# Patient Record
Sex: FEMALE | Race: WHITE | NOT HISPANIC OR LATINO
[De-identification: names, ages, dates, MRNs, and addresses within clinical notes are randomized per-mention and may not be internally consistent; named-entity substitution may affect disease eponyms.]

---

## 2018-11-06 ENCOUNTER — TRANSCRIBE ORDERS (OUTPATIENT)
Dept: SCHEDULING | Age: 58
End: 2018-11-06

## 2018-11-06 DIAGNOSIS — Z12.31 ENCOUNTER FOR SCREENING MAMMOGRAM FOR MALIGNANT NEOPLASM OF BREAST: Primary | ICD-10-CM

## 2019-02-11 ENCOUNTER — TRANSCRIBE ORDERS (OUTPATIENT)
Dept: RADIOLOGY | Facility: HOSPITAL | Age: 59
End: 2019-02-11

## 2019-02-11 ENCOUNTER — HOSPITAL ENCOUNTER (OUTPATIENT)
Dept: RADIOLOGY | Facility: HOSPITAL | Age: 59
Discharge: HOME | End: 2019-02-11
Attending: NURSE PRACTITIONER
Payer: COMMERCIAL

## 2019-02-11 DIAGNOSIS — Z12.31 ENCOUNTER FOR SCREENING MAMMOGRAM FOR MALIGNANT NEOPLASM OF BREAST: ICD-10-CM

## 2019-02-11 PROCEDURE — 77063 BREAST TOMOSYNTHESIS BI: CPT

## 2019-10-25 ENCOUNTER — OFFICE VISIT (OUTPATIENT)
Dept: GASTROENTEROLOGY | Facility: CLINIC | Age: 59
End: 2019-10-25
Payer: COMMERCIAL

## 2019-10-25 VITALS
DIASTOLIC BLOOD PRESSURE: 78 MMHG | WEIGHT: 186 LBS | HEART RATE: 80 BPM | HEIGHT: 65 IN | SYSTOLIC BLOOD PRESSURE: 122 MMHG | BODY MASS INDEX: 30.99 KG/M2

## 2019-10-25 DIAGNOSIS — K58.0 IRRITABLE BOWEL SYNDROME WITH DIARRHEA: ICD-10-CM

## 2019-10-25 DIAGNOSIS — K57.92 DIVERTICULITIS: Primary | ICD-10-CM

## 2019-10-25 DIAGNOSIS — K21.9 GASTROESOPHAGEAL REFLUX DISEASE WITHOUT ESOPHAGITIS: ICD-10-CM

## 2019-10-25 DIAGNOSIS — K57.32 DIVERTICULITIS OF LARGE INTESTINE WITHOUT PERFORATION OR ABSCESS WITHOUT BLEEDING: Primary | ICD-10-CM

## 2019-10-25 PROCEDURE — 99214 OFFICE O/P EST MOD 30 MIN: CPT | Performed by: INTERNAL MEDICINE

## 2019-10-25 RX ORDER — CLONAZEPAM 1 MG/1
1 TABLET ORAL AS NEEDED
COMMUNITY

## 2019-10-25 RX ORDER — METRONIDAZOLE 500 MG/1
500 TABLET ORAL 3 TIMES DAILY
Qty: 30 TABLET | Refills: 0 | Status: SHIPPED | OUTPATIENT
Start: 2019-10-25 | End: 2019-11-04

## 2019-10-25 RX ORDER — LEVOFLOXACIN 500 MG/1
500 TABLET, FILM COATED ORAL EVERY 24 HOURS
Qty: 10 TABLET | Refills: 0 | Status: SHIPPED | OUTPATIENT
Start: 2019-10-25 | End: 2019-11-04

## 2019-10-25 NOTE — PATIENT INSTRUCTIONS
complete antibiotics as prescribed  Please contact us if symptoms fail to improve we could discuss prolonged antibiotics or arranging a CT scan    Proceed with colonoscopy in about 6 weeks

## 2019-10-25 NOTE — PROGRESS NOTES
[facility] - Outpatient Consultation  Harshad Valera 63 y o  female MRN: [id number]  Encounter: [id number]    ASSESSMENT AND PLAN:      1  Diverticulitis of large intestine without perforation or abscess without bleeding  prior history of diverticulitis, sigmoid resection in 2016, intermittent flare ups over the past several months that initially responded to bowel rest, but have become more refractory  Will treat with oral antibiotics  she will contact me if symptoms fail to improve and we can arrange imaging  Will proceed with a colonoscopy in 6-8 weeks  - metroNIDAZOLE (FLAGYL) 500 mg tablet; Take 1 tablet (500 mg total) by mouth 3 (three) times a day for 10 days  Dispense: 30 tablet; Refill: 0  - levofloxacin (LEVAQUIN) 500 mg tablet; Take 1 tablet (500 mg total) by mouth every 24 hours for 10 days  Dispense: 10 tablet; Refill: 0      2  Gastroesophageal reflux disease without esophagitis    Well controlled with anti-reflux diet    3  Irritable bowel syndrome with diarrhea   had been well controlled with Levbid as needed  Stools have been softer recently  Will assess further at time of upcoming colonoscopy      Followup Appointment:  Pending colonoscopy  ______________________________________________________________________    Chief Complaint   Patient presents with    Abd  pain off and on, history of diverticuliltis       HPI:   Harshad Valera is a 63y o  year old female who presents   As a self-referral for abdominal pain  She was last seen by our practice in June of 2014 at the time of hospitalization at [facility] for acute sigmoid diverticulitis  She subsequently had a sigmoid resection with Dr Libby Flores   She was asymptomatic from the GI perspective until earlier this year  Earlier this year she had a few mild episodes of abdominal discomfort reminiscent of prior bouts of diverticulitis    She restricting herself to a clear liquid diet and symptoms resolved within a few days without the need for antibiotics  She had symptoms in early September and again in early October with symptoms lasting several days  She currently complains of crampy periumbilical pain that radiates to the left lower quadrant  Symptoms are associated with bloating and abdominal distention  Stools are looser than her typically IBS-D baseline  She denies any nausea, vomiting or weight loss  She denies any specific food triggers  She denies any rectal bleeding  She has no fevers or chills  She denies any travel, sick contacts or recent antibiotics  Historical Information   Past Medical History:   Diagnosis Date    Depression      Past Surgical History:   Procedure Laterality Date    [facility]    COLONOSCOPY  06/13/2014     pandiverticulosis     Social History     Substance and Sexual Activity   Alcohol Use Yes    Frequency: 2-3 times a week     Social History     Substance and Sexual Activity   Drug Use Not on file     Social History     Tobacco Use   Smoking Status Former Smoker   Smokeless Tobacco Never Used     Family History   Problem Relation Age of Onset    Other Mother         Ruptured colon due to diverticulitis    Colon cancer Maternal Grandmother     Colon polyps Neg Hx        Meds/Allergies     Current Outpatient Medications:     clonazePAM (KlonoPIN) 1 mg tablet    Hyoscyamine Sulfate (LEVBID PO)    levofloxacin (LEVAQUIN) 500 mg tablet    metroNIDAZOLE (FLAGYL) 500 mg tablet    Allergies   Allergen Reactions    Amoxicillin     Ampicillin     Codeine     Penicillins     Vancomycin        PHYSICAL EXAM:    Blood pressure 122/78, pulse 80, height 5' 5" (1 651 m), weight 84 4 kg (186 lb)  Body mass index is 30 95 kg/m²  General Appearance: NAD, cooperative, alert  Eyes: Anicteric, PERRLA, EOMI  ENT:  Normocephalic, atraumatic, normal mucosa      Neck:  Supple, symmetrical, trachea midline, Resp:  Clear to auscultation bilaterally; no rales, rhonchi or wheezing; respirations unlabored   CV:  S1 S2, Regular rate and rhythm; no murmur, rub, or gallop  GI:  Soft,   Mild periumbilical tenderness, non-distended; normal bowel sounds; no masses, no organomegaly   Rectal: Deferred  Musculoskeletal: No cyanosis, clubbing or edema  Normal ROM  Skin:  No jaundice, rashes, or lesions   Heme/Lymph: No palpable cervical lymphadenopathy  Psych: Normal affect, good eye contact  Neuro: No gross deficits, AAOx3    Lab Results:   No results found for: WBC, HGB, HCT, MCV, PLT  No results found for: NA, K, CL, CO2, ANIONGAP, BUN, CREATININE, GLUCOSE, GLUF, CALCIUM, CORRECTEDCA, AST, ALT, ALKPHOS, PROT, BILITOT, EGFR  No results found for: IRON, TIBC, FERRITIN  No results found for: LIPASE    Radiology Results:   No results found  REVIEW OF SYSTEMS:    CONSTITUTIONAL: Denies any fever, chills, rigors, and weight loss  HEENT: No earache or tinnitus  Denies hearing loss or visual disturbances  CARDIOVASCULAR: No chest pain or palpitations  RESPIRATORY: Denies any cough, hemoptysis, shortness of breath or dyspnea on exertion  GASTROINTESTINAL: As noted in the History of Present Illness  GENITOURINARY: No problems with urination  Denies any hematuria or dysuria  NEUROLOGIC: No dizziness or vertigo, denies headaches  MUSCULOSKELETAL: Denies any muscle or joint pain  SKIN: Denies skin rashes or itching  ENDOCRINE: Denies excessive thirst  Denies intolerance to heat or cold  PSYCHOSOCIAL: Denies depression or anxiety  Denies any recent memory loss

## 2019-11-04 ENCOUNTER — TELEPHONE (OUTPATIENT)
Dept: GASTROENTEROLOGY | Facility: CLINIC | Age: 59
End: 2019-11-04

## 2019-11-04 DIAGNOSIS — R19.7 DIARRHEA, UNSPECIFIED TYPE: Primary | ICD-10-CM

## 2019-11-04 DIAGNOSIS — K57.92 DIVERTICULITIS: ICD-10-CM

## 2019-11-04 NOTE — TELEPHONE ENCOUNTER
called back stating answering service called in stating patient left message to call her back ASAP, on two antibiotics and no improvement  [phone]  I called number and went directly to answering message  I left message instructing patient call back  Your office note states proceed with imaging if symptoms fail to improve  Please advise so we can address with patient  Patient did call back, she is finishing antibiotics and states this weekend she felt progressively worse and is "extremely tired"  I advised we may not get back to her with plan until tomorrow  Okay to leave message if she doesn't answer

## 2019-11-04 NOTE — TELEPHONE ENCOUNTER
Discussed with patient  Will proceed with CT abdomen and pelvis with oral and IV contrast     Lab order sent to [facility]    CT order placed, she would like to go to [place], please help to arrange this

## 2019-11-05 NOTE — TELEPHONE ENCOUNTER
Spoke with patient  Advised re capitation for CT scan and to go get labs done prior  Patient is to call back with fax # to fax order for CT scan to [facility]

## 2019-11-05 NOTE — TELEPHONE ENCOUNTER
Patient is capped to [facility],   precerted to that location   FYI, patient not notified, was unsure if she was aware of test ordered   precert [id number] expires 1/3/20

## 2019-11-06 LAB
BUN SERPL-MCNC: 6 MG/DL (ref 6–24)
BUN/CREAT SERPL: 7 (ref 9–23)
CALCIUM SERPL-MCNC: 9.9 MG/DL (ref 8.7–10.2)
CHLORIDE SERPL-SCNC: 105 MMOL/L (ref 96–106)
CO2 SERPL-SCNC: 22 MMOL/L (ref 20–29)
CREAT SERPL-MCNC: 0.92 MG/DL (ref 0.57–1)
ERYTHROCYTE [DISTWIDTH] IN BLOOD BY AUTOMATED COUNT: 14 % (ref 12.3–15.4)
GLUCOSE SERPL-MCNC: 116 MG/DL (ref 65–99)
HCT VFR BLD AUTO: 44.8 % (ref 34–46.6)
HGB BLD-MCNC: 15.5 G/DL (ref 11.1–15.9)
MCH RBC QN AUTO: 31.1 PG (ref 26.6–33)
MCHC RBC AUTO-ENTMCNC: 34.6 G/DL (ref 31.5–35.7)
MCV RBC AUTO: 90 FL (ref 79–97)
PLATELET # BLD AUTO: 269 X10E3/UL (ref 150–450)
POTASSIUM SERPL-SCNC: 4.2 MMOL/L (ref 3.5–5.2)
RBC # BLD AUTO: 4.99 X10E6/UL (ref 3.77–5.28)
SL AMB EGFR AFRICAN AMERICAN: 79 ML/MIN/1.73
SL AMB EGFR NON AFRICAN AMERICAN: 69 ML/MIN/1.73
SODIUM SERPL-SCNC: 141 MMOL/L (ref 134–144)
WBC # BLD AUTO: 5.4 X10E3/UL (ref 3.4–10.8)

## 2019-11-08 RX ORDER — DICYCLOMINE HYDROCHLORIDE 10 MG/1
10 CAPSULE ORAL
Qty: 120 CAPSULE | Refills: 3 | Status: SHIPPED | OUTPATIENT
Start: 2019-11-08

## 2019-11-08 NOTE — TELEPHONE ENCOUNTER
Patient still has stomach pain and extreme bloating  Anything she eats makes her naseous and causes more bloating  Constantly in pain and uncomfortable  No fever  She has been achey the last 3 days  She has bad diarrhea  Told before she has IBS but this is "beyond IBS"  The labs ordered by Chrystal Wallace are resulted and I did advise her they are normal (as per KK comments)  I have placed the CT scan on your desk to review and advise the patient  Thank you

## 2019-11-08 NOTE — TELEPHONE ENCOUNTER
(when computers were down)  Pt states she saw Dr Menard Sit a couple wks ago for diverticulitis; is not better; she talked w/ Dr on Mon, did blood work Tues at [facility] and had CT yesterday at [employer] [facility]; is not felling well/has pain #7 in left upper abd and is extremely bloated   Kelsie Sow [phone]

## 2019-11-09 NOTE — TELEPHONE ENCOUNTER
I spoke to patient and she continues with left lower quadrant abdominal pain, bloating and diarrhea  She is having 10-12 postprandial loose stools daily  Send stool studies for enteric pathogens and C diff sent to [facility] [address] that she increase probiotics to 2 a day and change from Levsin to Bentyl 10 mg 4 times a day, as she reports the Levsin is ineffective  If symptoms worsen or do not improve I told her to call the on-call physician this weekend  I also gave her the results of her CT scan  The CT scan was negative for any acute inflammatory process including diverticulitis

## 2019-11-15 ENCOUNTER — TELEPHONE (OUTPATIENT)
Dept: GASTROENTEROLOGY | Facility: CLINIC | Age: 59
End: 2019-11-15

## 2019-11-15 LAB

## 2019-11-15 NOTE — TELEPHONE ENCOUNTER
Pt states she saw Dr Sam Espinoza a couple wks ago; had diverticulitis/got Abd; was not well; had CT then did stool studies/asks for CB w/ lab results from [facility] to [phone]  I noted since after 4 likely not to be called back today  Pt then said she is still not well and at wits end

## 2019-11-15 NOTE — TELEPHONE ENCOUNTER
Reviewed last office note  CT was negative, then Ml ordered stool studies which are also negative    Please arrange colonoscopy ASAP to assess for IBD and microscopic colitis

## 2019-11-18 NOTE — TELEPHONE ENCOUNTER
Patient called back and is just keeping the previously scheduled date of 12/3 with Stephanie-- " already took off work, no point in rescheduling"

## 2019-11-18 NOTE — TELEPHONE ENCOUNTER
Called and left message for pt  As of right now with KK, there is Baron Gaster Friday available and then only 12/2 for sooner appointment for colon at [facility] ([facility])

## 2020-02-18 ENCOUNTER — TRANSCRIBE ORDERS (OUTPATIENT)
Dept: SCHEDULING | Age: 60
End: 2020-02-18

## 2020-02-24 ENCOUNTER — TRANSCRIBE ORDERS (OUTPATIENT)
Dept: RADIOLOGY | Facility: HOSPITAL | Age: 60
End: 2020-02-24

## 2020-02-24 ENCOUNTER — HOSPITAL ENCOUNTER (OUTPATIENT)
Dept: RADIOLOGY | Facility: HOSPITAL | Age: 60
Discharge: HOME | End: 2020-02-24
Attending: NURSE PRACTITIONER
Payer: COMMERCIAL

## 2020-02-24 DIAGNOSIS — Z12.31 ENCOUNTER FOR SCREENING MAMMOGRAM FOR MALIGNANT NEOPLASM OF BREAST: Primary | ICD-10-CM

## 2020-02-24 DIAGNOSIS — Z12.31 ENCOUNTER FOR SCREENING MAMMOGRAM FOR MALIGNANT NEOPLASM OF BREAST: ICD-10-CM

## 2020-02-24 PROCEDURE — 77063 BREAST TOMOSYNTHESIS BI: CPT

## 2021-03-25 ENCOUNTER — TRANSCRIBE ORDERS (OUTPATIENT)
Dept: RADIOLOGY | Facility: HOSPITAL | Age: 61
End: 2021-03-25

## 2021-03-25 ENCOUNTER — HOSPITAL ENCOUNTER (OUTPATIENT)
Dept: RADIOLOGY | Facility: HOSPITAL | Age: 61
Discharge: HOME | End: 2021-03-25
Attending: NURSE PRACTITIONER
Payer: COMMERCIAL

## 2021-03-25 DIAGNOSIS — Z12.31 ENCOUNTER FOR SCREENING MAMMOGRAM FOR MALIGNANT NEOPLASM OF BREAST: Primary | ICD-10-CM

## 2021-03-25 DIAGNOSIS — Z12.31 ENCOUNTER FOR SCREENING MAMMOGRAM FOR MALIGNANT NEOPLASM OF BREAST: ICD-10-CM

## 2021-03-25 PROCEDURE — 77067 SCR MAMMO BI INCL CAD: CPT

## 2022-03-28 ENCOUNTER — TRANSCRIBE ORDERS (OUTPATIENT)
Dept: RADIOLOGY | Facility: HOSPITAL | Age: 62
End: 2022-03-28

## 2022-03-28 ENCOUNTER — HOSPITAL ENCOUNTER (OUTPATIENT)
Dept: RADIOLOGY | Facility: HOSPITAL | Age: 62
Discharge: HOME | End: 2022-03-28
Attending: NURSE PRACTITIONER
Payer: COMMERCIAL

## 2022-03-28 DIAGNOSIS — Z12.31 ENCOUNTER FOR SCREENING MAMMOGRAM FOR MALIGNANT NEOPLASM OF BREAST: ICD-10-CM

## 2022-03-28 DIAGNOSIS — Z12.31 ENCOUNTER FOR SCREENING MAMMOGRAM FOR MALIGNANT NEOPLASM OF BREAST: Primary | ICD-10-CM

## 2022-03-28 PROCEDURE — 77067 SCR MAMMO BI INCL CAD: CPT

## 2023-03-30 ENCOUNTER — HOSPITAL ENCOUNTER (OUTPATIENT)
Dept: RADIOLOGY | Facility: HOSPITAL | Age: 63
Discharge: HOME | End: 2023-03-30
Attending: NURSE PRACTITIONER
Payer: COMMERCIAL

## 2023-03-30 ENCOUNTER — TRANSCRIBE ORDERS (OUTPATIENT)
Dept: RADIOLOGY | Facility: HOSPITAL | Age: 63
End: 2023-03-30

## 2023-03-30 DIAGNOSIS — Z12.31 ENCOUNTER FOR SCREENING MAMMOGRAM FOR MALIGNANT NEOPLASM OF BREAST: Primary | ICD-10-CM

## 2023-03-30 DIAGNOSIS — Z12.31 ENCOUNTER FOR SCREENING MAMMOGRAM FOR MALIGNANT NEOPLASM OF BREAST: ICD-10-CM

## 2023-03-30 PROCEDURE — 77067 SCR MAMMO BI INCL CAD: CPT

## 2024-09-26 ENCOUNTER — TELEPHONE (OUTPATIENT)
Age: 64
End: 2024-09-26

## 2024-09-26 NOTE — TELEPHONE ENCOUNTER
Patients GI provider: Dr. Solis    Number to return call: [phone]    Reason for call: [facility] called in requesting for colonoscopy report sent over to JACOB [place] office at fax number [phone]. Thank you.    Scheduled procedure/appointment date if applicable: Apt/procedure n/a

## 2025-02-11 ENCOUNTER — TELEPHONE (OUTPATIENT)
Age: 65
End: 2025-02-11

## 2025-02-11 NOTE — TELEPHONE ENCOUNTER
Patient added to TT and MM wait list.    *[place] location, female provider/therapist.     *please send outside resource packet to email on file.

## 2025-05-27 ENCOUNTER — HOSPITAL ENCOUNTER (OUTPATIENT)
Dept: RADIOLOGY | Facility: HOSPITAL | Age: 65
Discharge: HOME | End: 2025-05-27
Attending: NURSE PRACTITIONER
Payer: COMMERCIAL

## 2025-05-27 ENCOUNTER — TRANSCRIBE ORDERS (OUTPATIENT)
Dept: RADIOLOGY | Facility: HOSPITAL | Age: 65
End: 2025-05-27

## 2025-05-27 DIAGNOSIS — Z12.31 VISIT FOR SCREENING MAMMOGRAM: ICD-10-CM

## 2025-05-27 DIAGNOSIS — Z12.31 VISIT FOR SCREENING MAMMOGRAM: Primary | ICD-10-CM

## 2025-05-27 PROCEDURE — 77067 SCR MAMMO BI INCL CAD: CPT

## 2025-06-05 ENCOUNTER — TRANSCRIBE ORDERS (OUTPATIENT)
Dept: SCHEDULING | Age: 65
End: 2025-06-05

## 2025-06-11 ENCOUNTER — HOSPITAL ENCOUNTER (OUTPATIENT)
Dept: RADIOLOGY | Facility: HOSPITAL | Age: 65
Discharge: HOME | End: 2025-06-11
Attending: STUDENT IN AN ORGANIZED HEALTH CARE EDUCATION/TRAINING PROGRAM
Payer: COMMERCIAL

## 2025-06-11 ENCOUNTER — HOSPITAL ENCOUNTER (OUTPATIENT)
Dept: RADIOLOGY | Facility: HOSPITAL | Age: 65
Discharge: HOME | End: 2025-06-11
Attending: NURSE PRACTITIONER
Payer: COMMERCIAL

## 2025-06-11 ENCOUNTER — HOSPITAL ENCOUNTER (OUTPATIENT)
Dept: RADIOLOGY | Facility: HOSPITAL | Age: 65
Discharge: HOME | End: 2025-06-11
Attending: RADIOLOGY
Payer: COMMERCIAL

## 2025-06-11 DIAGNOSIS — R92.1 BREAST CALCIFICATIONS: ICD-10-CM

## 2025-06-11 DIAGNOSIS — R92.8 ABNORMAL MAMMOGRAM: ICD-10-CM

## 2025-06-11 PROCEDURE — 76642 ULTRASOUND BREAST LIMITED: CPT | Mod: LT

## 2025-06-11 PROCEDURE — G0279 TOMOSYNTHESIS, MAMMO: HCPCS
